# Patient Record
Sex: MALE | Race: WHITE | Employment: FULL TIME | URBAN - METROPOLITAN AREA
[De-identification: names, ages, dates, MRNs, and addresses within clinical notes are randomized per-mention and may not be internally consistent; named-entity substitution may affect disease eponyms.]

---

## 2017-09-14 ENCOUNTER — HOSPITAL ENCOUNTER (OUTPATIENT)
Dept: GENERAL RADIOLOGY | Age: 32
Discharge: HOME | End: 2017-09-14
Payer: COMMERCIAL

## 2017-09-14 ENCOUNTER — HOSPITAL ENCOUNTER (OUTPATIENT)
Age: 32
Discharge: HOME | End: 2017-09-14
Payer: COMMERCIAL

## 2017-09-14 ENCOUNTER — OFFICE VISIT (OUTPATIENT)
Dept: FAMILY MEDICINE CLINIC | Age: 32
End: 2017-09-14

## 2017-09-14 VITALS
OXYGEN SATURATION: 98 % | SYSTOLIC BLOOD PRESSURE: 98 MMHG | DIASTOLIC BLOOD PRESSURE: 58 MMHG | TEMPERATURE: 98.3 F | WEIGHT: 153.8 LBS | BODY MASS INDEX: 20.83 KG/M2 | HEART RATE: 66 BPM | HEIGHT: 72 IN

## 2017-09-14 DIAGNOSIS — R10.32 LEFT GROIN PAIN: Primary | ICD-10-CM

## 2017-09-14 DIAGNOSIS — R05.9 COUGH: ICD-10-CM

## 2017-09-14 PROCEDURE — 99203 OFFICE O/P NEW LOW 30 MIN: CPT | Performed by: FAMILY MEDICINE

## 2017-09-14 PROCEDURE — 71020 XR CHEST STANDARD TWO VW: CPT

## 2017-09-14 ASSESSMENT — ENCOUNTER SYMPTOMS
ABDOMINAL PAIN: 0
SHORTNESS OF BREATH: 0
DIARRHEA: 0
NAUSEA: 0
VOMITING: 0
COUGH: 1
CONSTIPATION: 0
SORE THROAT: 0

## 2017-09-14 ASSESSMENT — PATIENT HEALTH QUESTIONNAIRE - PHQ9
2. FEELING DOWN, DEPRESSED OR HOPELESS: 0
1. LITTLE INTEREST OR PLEASURE IN DOING THINGS: 0
SUM OF ALL RESPONSES TO PHQ QUESTIONS 1-9: 0
SUM OF ALL RESPONSES TO PHQ9 QUESTIONS 1 & 2: 0

## 2017-09-15 DIAGNOSIS — R10.32 LEFT GROIN PAIN: Primary | ICD-10-CM

## 2019-04-01 ENCOUNTER — HOSPITAL ENCOUNTER (EMERGENCY)
Age: 34
Discharge: HOME | End: 2019-04-01
Attending: EMERGENCY MEDICINE

## 2019-04-01 VITALS
SYSTOLIC BLOOD PRESSURE: 108 MMHG | HEIGHT: 72 IN | WEIGHT: 154.7 LBS | OXYGEN SATURATION: 99 % | HEART RATE: 71 BPM | TEMPERATURE: 97.9 F | DIASTOLIC BLOOD PRESSURE: 76 MMHG | RESPIRATION RATE: 16 BRPM | BODY MASS INDEX: 20.95 KG/M2

## 2019-04-01 DIAGNOSIS — K08.89 PAIN, DENTAL: Primary | ICD-10-CM

## 2019-04-01 PROCEDURE — 99282 EMERGENCY DEPT VISIT SF MDM: CPT

## 2019-04-01 RX ORDER — CLINDAMYCIN HYDROCHLORIDE 300 MG/1
300 CAPSULE ORAL 3 TIMES DAILY
Qty: 21 CAPSULE | Refills: 0 | Status: SHIPPED | OUTPATIENT
Start: 2019-04-01 | End: 2019-04-08

## 2019-04-01 RX ORDER — HYDROCODONE BITARTRATE AND ACETAMINOPHEN 5; 325 MG/1; MG/1
1 TABLET ORAL EVERY 8 HOURS PRN
Qty: 15 TABLET | Refills: 0 | Status: SHIPPED | OUTPATIENT
Start: 2019-04-01 | End: 2019-04-08

## 2019-04-01 ASSESSMENT — PAIN DESCRIPTION - DESCRIPTORS: DESCRIPTORS: CONSTANT;THROBBING;SHARP

## 2019-04-01 ASSESSMENT — PAIN DESCRIPTION - LOCATION: LOCATION: TEETH

## 2019-04-01 ASSESSMENT — PAIN SCALES - GENERAL: PAINLEVEL_OUTOF10: 7

## 2019-04-01 ASSESSMENT — PAIN DESCRIPTION - ORIENTATION: ORIENTATION: LOWER;UPPER

## 2019-04-01 NOTE — ED PROVIDER NOTES
4, 8 or more for level 5)     Vitals:    04/01/19 1807   BP: 108/76   Pulse: 71   Resp: 16   Temp: 97.9 °F (36.6 °C)   TempSrc: Oral   SpO2: 99%   Weight: 154 lb 11.2 oz (70.2 kg)   Height: 6' (1.829 m)         Physical Exam   Constitutional: He is oriented to person, place, and time. He appears well-developed and well-nourished. HENT:   Mouth/Throat: Uvula is midline, oropharynx is clear and moist and mucous membranes are normal. No oral lesions. No dental abscesses. Eyes: Conjunctivae are normal. Right eye exhibits no discharge. Left eye exhibits no discharge. Neck: Neck supple. Cardiovascular: Normal rate and regular rhythm. Pulmonary/Chest: Effort normal. No respiratory distress. Lymphadenopathy:     He has no cervical adenopathy. Neurological: He is alert and oriented to person, place, and time. Skin: Skin is warm and dry. No erythema. Psychiatric: He has a normal mood and affect. His behavior is normal.           DIAGNOSTIC RESULTS     EKG: All EKG's are interpreted by the Emergency Department Physician who either signs or Co-signs this chart in the absence of a cardiologist.    RADIOLOGY:   Non-plain film images such as CT, Ultrasound and MRI are read by the radiologist. Plain radiographic images are visualized and preliminarily interpreted by the emergency physician with the below findings:    Interpretation per the Radiologist below, if available at the time of this note:    No orders to display           LABS:  Labs Reviewed - No data to display    All other labs were within normal range or not returned as of this dictation. EMERGENCY DEPARTMENT COURSE and DIFFERENTIAL DIAGNOSIS/MDM:   Vitals:    Vitals:    04/01/19 1807   BP: 108/76   Pulse: 71   Resp: 16   Temp: 97.9 °F (36.6 °C)   TempSrc: Oral   SpO2: 99%   Weight: 154 lb 11.2 oz (70.2 kg)   Height: 6' (1.829 m)       Medical Decision Making: A 60-year-old male with dental pain. He is afebrile and does not appear ill.   2 broken teeth. No dental abscess. Emergent workup was not indicated. Allergic to penicillin and codeine. He will be discharged home with clindamycin and Norco.      FINAL IMPRESSION      1. Pain, dental          DISPOSITION/PLAN   DISPOSITION Decision To Discharge 04/01/2019 06:24:44 PM      PATIENT REFERRED TO:     Follow-up with a dentist even if your symptoms improve with these medications. Call in 1 day      Carlos Bower MD  [address]  [phone]      As needed      DISCHARGE MEDICATIONS:     New Prescriptions    CLINDAMYCIN (CLEOCIN) 300 MG CAPSULE    Take 1 capsule by mouth 3 times daily for 7 days    HYDROCODONE-ACETAMINOPHEN (NORCO) 5-325 MG PER TABLET    Take 1 tablet by mouth every 8 hours as needed for Pain for up to 7 days. (Please note that portions of this note were completed with a voice recognition program.  Efforts were made to edit the dictations but occasionally words are mis-transcribed. )    GRANT ESQUIVEL, HERSON - HERSON Dupree - STELLA  04/01/19 2584

## 2019-04-01 NOTE — ED PROVIDER NOTES
Attending Supervisory Note/Shared Visit   I have personally performed a face to face diagnostic evaluation on this patient. I have reviewed the mid-levels findings and agree.   Treatment and management      (Please note that portions of this note were completed with a voice recognition program.  Efforts were made to edit the dictations but occasionally words are mis-transcribed.)    Irwin Sanchez MD  Attending Emergency Physician       Irwin Sanchez MD  04/01/19 255 Yg Yen MD  04/01/19 5303

## 2019-06-28 ENCOUNTER — HOSPITAL ENCOUNTER (EMERGENCY)
Age: 34
Discharge: HOME | End: 2019-06-28
Attending: EMERGENCY MEDICINE

## 2019-06-28 VITALS
TEMPERATURE: 98.1 F | OXYGEN SATURATION: 100 % | BODY MASS INDEX: 22.75 KG/M2 | HEIGHT: 72 IN | WEIGHT: 168 LBS | RESPIRATION RATE: 18 BRPM | SYSTOLIC BLOOD PRESSURE: 140 MMHG | HEART RATE: 64 BPM | DIASTOLIC BLOOD PRESSURE: 83 MMHG

## 2019-06-28 DIAGNOSIS — K08.89 PAIN, DENTAL: Primary | ICD-10-CM

## 2019-06-28 PROCEDURE — 99282 EMERGENCY DEPT VISIT SF MDM: CPT

## 2019-06-28 RX ORDER — CLINDAMYCIN HYDROCHLORIDE 150 MG/1
300 CAPSULE ORAL 3 TIMES DAILY
Qty: 42 CAPSULE | Refills: 0 | Status: SHIPPED | OUTPATIENT
Start: 2019-06-28 | End: 2019-07-05

## 2019-06-28 RX ORDER — TRAMADOL HYDROCHLORIDE 50 MG/1
50 TABLET ORAL EVERY 6 HOURS PRN
Qty: 12 TABLET | Refills: 0 | Status: SHIPPED | OUTPATIENT
Start: 2019-06-28 | End: 2019-07-01

## 2019-06-28 ASSESSMENT — PAIN DESCRIPTION - LOCATION: LOCATION: TEETH

## 2019-06-28 ASSESSMENT — ENCOUNTER SYMPTOMS
RHINORRHEA: 0
SHORTNESS OF BREATH: 0
CONSTIPATION: 0
DIARRHEA: 0
NAUSEA: 0
ABDOMINAL PAIN: 0
WHEEZING: 0
SINUS PRESSURE: 0
COUGH: 0
VOMITING: 0
SORE THROAT: 0
COLOR CHANGE: 0

## 2019-06-28 ASSESSMENT — PAIN DESCRIPTION - ORIENTATION: ORIENTATION: LEFT

## 2019-06-28 ASSESSMENT — PAIN DESCRIPTION - FREQUENCY: FREQUENCY: CONTINUOUS

## 2019-06-28 ASSESSMENT — PAIN DESCRIPTION - DESCRIPTORS: DESCRIPTORS: CONSTANT;THROBBING;SHARP;SHOOTING

## 2019-06-28 ASSESSMENT — PAIN SCALES - GENERAL: PAINLEVEL_OUTOF10: 9

## 2019-06-28 NOTE — ED NOTES
Patient education flyer \"[facility] What [facility] to [facility]\" was provided and reviewed. Questions answered and understanding was verbalized by the patient and/or family.           Bradley Rivera RN  06/28/19 6673

## 2019-06-28 NOTE — ED PROVIDER NOTES
[facility] ED  eMERGENCY dEPARTMENT eNCOUnter      Pt Name: Robbi Ware  MRN: [id number]  [place] 1985  Date of evaluation: 6/28/2019  Provider: Demetrius Patel NP, HERSON - Seth [id number]       Chief Complaint   Patient presents with    Dental Pain     dental appt next tues         HISTORY OF PRESENT ILLNESS  (Location/Symptom, Timing/Onset, Context/Setting, Quality, Duration, Modifying Factors, Severity.)   Robbi Ware is a 29 y.o. male who presents to the emergency department by private vehicle for evaluation of left upper dental pain. Patient states that couple of days he has had pain to his left upper gumline. He states he has an appointment with his dentist on Tuesday. He denies any fevers or chills. He states the pain radiates into his ear. He rates the pain a 9. Nursing Notes were reviewed. ALLERGIES     Penicillins and Codeine    CURRENT MEDICATIONS       Discharge Medication List as of 6/28/2019  1:15 PM          PAST MEDICAL HISTORY   History reviewed. No pertinent past medical history. SURGICAL HISTORY           Procedure Laterality Date    WRIST SURGERY Right          FAMILY HISTORY           Problem Relation Age of Onset    Heart Disease Mother     Allergy (Severe) Father      Family Status   Relation Name Status    Mother  Alive    Father  Alive        SOCIAL HISTORY      reports that he has been smoking cigarettes. He has been smoking about 1.00 pack per day. He has never used smokeless tobacco. He reports that he drinks alcohol. He reports that he has current or past drug history. Drug: Marijuana. REVIEW OF SYSTEMS    (2-9 systems for level 4, 10 or more for level 5)     Review of Systems   Constitutional: Negative for chills, fever and unexpected weight change. HENT: Positive for dental problem. Negative for congestion, rhinorrhea, sinus pressure and sore throat. Respiratory: Negative for cough, shortness of breath and wheezing.     Cardiovascular: Negative for chest pain and palpitations. Gastrointestinal: Negative for abdominal pain, constipation, diarrhea, nausea and vomiting. Genitourinary: Negative for dysuria and hematuria. Musculoskeletal: Negative for arthralgias and myalgias. Skin: Negative for color change and rash. Neurological: Negative for dizziness, weakness and headaches. Hematological: Negative for adenopathy. Except as noted above the remainder of the review of systems was reviewed and negative. PHYSICAL EXAM    (up to 7 for level 4, 8 or more for level 5)     ED Triage Vitals [06/28/19 1257]   BP Temp Temp Source Pulse Resp SpO2 Height Weight   (!) 140/83 98.1 °F (36.7 °C) Oral 64 18 100 % 6' (1.829 m) 168 lb (76.2 kg)       Physical Exam   Constitutional: He is oriented to person, place, and time. He appears well-developed and well-nourished. HENT:   Head: Normocephalic and atraumatic. Mouth/Throat: Oropharynx is clear and moist.       Swelling to floor of the mouth   Eyes: Pupils are equal, round, and reactive to light. Conjunctivae are normal.   Neck: Normal range of motion. Neck supple. Cardiovascular: Normal rate and regular rhythm. Pulmonary/Chest: Effort normal and breath sounds normal. No stridor. No respiratory distress. Abdominal: Soft. Bowel sounds are normal.   Musculoskeletal: Normal range of motion. Lymphadenopathy:     He has no cervical adenopathy. Neurological: He is alert and oriented to person, place, and time. Skin: Skin is warm and dry. No rash noted. Psychiatric: He has a normal mood and affect. Vitals reviewed. LABS:  Labs Reviewed - No data to display    All other labs were within normal range or not returned as of this dictation.     EMERGENCY DEPARTMENT COURSE and DIFFERENTIAL DIAGNOSIS/MDM:   Vitals:    Vitals:    06/28/19 1257   BP: (!) 140/83   Pulse: 64   Resp: 18   Temp: 98.1 °F (36.7 °C)   TempSrc: Oral   SpO2: 100%   Weight: 168 lb (76.2 kg)   Height: 6' (1.829 m)       Medical Decision Making: Will be placed on clindamycin and tramadol. Follow-up with his dentist as scheduled on Tuesday  FINAL IMPRESSION      1. Pain, dental          DISPOSITION/PLAN   DISPOSITION Decision To Discharge 06/28/2019 01:06:51 PM      PATIENT REFERRED TO:   [facility] ED  [address]  [phone]        same day PCP  [phone]          DISCHARGE MEDICATIONS:     Discharge Medication List as of 6/28/2019  1:15 PM      START taking these medications    Details   clindamycin (CLEOCIN) 150 MG capsule Take 2 capsules by mouth 3 times daily for 7 days, Disp-42 capsule, R-0Print      traMADol (ULTRAM) 50 MG tablet Take 1 tablet by mouth every 6 hours as needed for Pain for up to 3 days. Intended supply: 3 days.  Take lowest dose possible to manage pain, Disp-12 tablet, R-0Print                 (Please note that portions of this note were completed with a voice recognition program.  Efforts were made to edit the dictations but occasionally words are mis-transcribed.)    Nathanael Martin NP, APRN - CNP  Certified Nurse Practitioner          HERSON Olivares CNP  06/28/19 1095

## 2019-09-02 ENCOUNTER — HOSPITAL ENCOUNTER (EMERGENCY)
Age: 34
Discharge: ANOTHER HOSPITAL | End: 2019-09-02
Attending: EMERGENCY MEDICINE

## 2019-09-02 ENCOUNTER — APPOINTMENT (OUTPATIENT)
Dept: GENERAL RADIOLOGY | Age: 34
End: 2019-09-02

## 2019-09-02 ENCOUNTER — HOSPITAL ENCOUNTER (EMERGENCY)
Age: 34
Discharge: HOME | End: 2019-09-02
Attending: EMERGENCY MEDICINE

## 2019-09-02 VITALS
TEMPERATURE: 98.5 F | SYSTOLIC BLOOD PRESSURE: 125 MMHG | HEART RATE: 61 BPM | BODY MASS INDEX: 21.13 KG/M2 | HEIGHT: 72 IN | RESPIRATION RATE: 28 BRPM | DIASTOLIC BLOOD PRESSURE: 92 MMHG | WEIGHT: 156 LBS | OXYGEN SATURATION: 100 %

## 2019-09-02 DIAGNOSIS — T30.0 BURN: Primary | ICD-10-CM

## 2019-09-02 LAB
ABO/RH: NORMAL
ABSOLUTE EOS #: 0.1 K/UL (ref 0–0.4)
ABSOLUTE IMMATURE GRANULOCYTE: ABNORMAL K/UL (ref 0–0.3)
ABSOLUTE LYMPH #: 2.3 K/UL (ref 1–4.8)
ABSOLUTE MONO #: 1.1 K/UL (ref 0.1–1.3)
ALBUMIN SERPL-MCNC: 5.1 G/DL (ref 3.5–5.2)
ALBUMIN/GLOBULIN RATIO: NORMAL (ref 1–2.5)
ALLEN TEST: ABNORMAL
ALP BLD-CCNC: 74 U/L (ref 40–129)
ALT SERPL-CCNC: 12 U/L (ref 5–41)
AMPHETAMINE SCREEN URINE: NEGATIVE
ANION GAP SERPL CALCULATED.3IONS-SCNC: 12 MMOL/L (ref 9–17)
ANION GAP SERPL CALCULATED.3IONS-SCNC: 15 MMOL/L (ref 9–17)
ANTIBODY SCREEN: NEGATIVE
ARM BAND NUMBER: NORMAL
AST SERPL-CCNC: 27 U/L
BARBITURATE SCREEN URINE: NEGATIVE
BASOPHILS # BLD: 1 % (ref 0–2)
BASOPHILS ABSOLUTE: 0.1 K/UL (ref 0–0.2)
BENZODIAZEPINE SCREEN, URINE: NEGATIVE
BILIRUB SERPL-MCNC: 0.58 MG/DL (ref 0.3–1.2)
BILIRUBIN URINE: NEGATIVE
BLOOD BANK SPECIMEN: ABNORMAL
BUN BLDV-MCNC: 12 MG/DL (ref 6–20)
BUN BLDV-MCNC: 13 MG/DL (ref 6–20)
BUN/CREAT BLD: NORMAL (ref 9–20)
BUPRENORPHINE URINE: ABNORMAL
CALCIUM SERPL-MCNC: 10 MG/DL (ref 8.6–10.4)
CANNABINOID SCREEN URINE: POSITIVE
CARBOXYHEMOGLOBIN: 4.4 % (ref 0–5)
CHLORIDE BLD-SCNC: 102 MMOL/L (ref 98–107)
CHLORIDE BLD-SCNC: 102 MMOL/L (ref 98–107)
CO2: 24 MMOL/L (ref 20–31)
CO2: 24 MMOL/L (ref 20–31)
COCAINE METABOLITE, URINE: NEGATIVE
COLOR: YELLOW
COMMENT UA: NORMAL
CREAT SERPL-MCNC: 0.75 MG/DL (ref 0.7–1.2)
CREAT SERPL-MCNC: 0.87 MG/DL (ref 0.7–1.2)
DIFFERENTIAL TYPE: ABNORMAL
EOSINOPHILS RELATIVE PERCENT: 1 % (ref 0–4)
ETHANOL PERCENT: <0.01 %
ETHANOL PERCENT: <0.01 %
ETHANOL: <10 MG/DL
ETHANOL: <10 MG/DL
EXPIRATION DATE: NORMAL
FIO2: ABNORMAL
GFR AFRICAN AMERICAN: >60 ML/MIN
GFR AFRICAN AMERICAN: >60 ML/MIN
GFR NON-AFRICAN AMERICAN: >60 ML/MIN
GFR NON-AFRICAN AMERICAN: >60 ML/MIN
GFR SERPL CREATININE-BSD FRML MDRD: ABNORMAL ML/MIN/{1.73_M2}
GFR SERPL CREATININE-BSD FRML MDRD: ABNORMAL ML/MIN/{1.73_M2}
GFR SERPL CREATININE-BSD FRML MDRD: NORMAL ML/MIN/{1.73_M2}
GFR SERPL CREATININE-BSD FRML MDRD: NORMAL ML/MIN/{1.73_M2}
GLUCOSE BLD-MCNC: 88 MG/DL (ref 70–99)
GLUCOSE BLD-MCNC: 98 MG/DL (ref 70–99)
GLUCOSE URINE: NEGATIVE
HCG QUALITATIVE: ABNORMAL
HCO3 VENOUS: 25.3 MMOL/L (ref 24–30)
HCT VFR BLD CALC: 47.4 % (ref 40.7–50.3)
HCT VFR BLD CALC: 51.3 % (ref 41–53)
HEMOGLOBIN: 15.3 G/DL (ref 13–17)
HEMOGLOBIN: 17 G/DL (ref 13.5–17.5)
IMMATURE GRANULOCYTES: ABNORMAL %
INR BLD: 0.9
INR BLD: 1
KETONES, URINE: NEGATIVE
LEUKOCYTE ESTERASE, URINE: NEGATIVE
LYMPHOCYTES # BLD: 22 % (ref 24–44)
MCH RBC QN AUTO: 31.3 PG (ref 26–34)
MCH RBC QN AUTO: 31.4 PG (ref 25.2–33.5)
MCHC RBC AUTO-ENTMCNC: 32.3 G/DL (ref 28.4–34.8)
MCHC RBC AUTO-ENTMCNC: 33 G/DL (ref 31–37)
MCV RBC AUTO: 94.7 FL (ref 80–100)
MCV RBC AUTO: 97.3 FL (ref 82.6–102.9)
MDMA URINE: ABNORMAL
METHADONE SCREEN, URINE: NEGATIVE
METHAMPHETAMINE, URINE: ABNORMAL
METHEMOGLOBIN: ABNORMAL % (ref 0–1.5)
MODE: ABNORMAL
MONOCYTES # BLD: 11 % (ref 1–7)
NEGATIVE BASE EXCESS, VEN: 0.9 MMOL/L (ref 0–2)
NITRITE, URINE: NEGATIVE
NOTIFICATION TIME: ABNORMAL
NOTIFICATION: ABNORMAL
NRBC AUTOMATED: 0 PER 100 WBC
NRBC AUTOMATED: ABNORMAL PER 100 WBC
O2 DEVICE/FLOW/%: ABNORMAL
O2 SAT, VEN: 40.7 % (ref 60–85)
OPIATES, URINE: NEGATIVE
OXYCODONE SCREEN URINE: NEGATIVE
OXYHEMOGLOBIN: ABNORMAL % (ref 95–98)
PARTIAL THROMBOPLASTIN TIME: 27.7 SEC (ref 20.5–30.5)
PARTIAL THROMBOPLASTIN TIME: 31.8 SEC (ref 24–36)
PATIENT TEMP: 37
PCO2, VEN, TEMP ADJ: ABNORMAL MMHG (ref 39–55)
PCO2, VEN: 49.9 (ref 39–55)
PDW BLD-RTO: 13.6 % (ref 11.8–14.4)
PDW BLD-RTO: 14.6 % (ref 11.5–14.9)
PEEP/CPAP: ABNORMAL
PH UA: 5.5 (ref 5–8)
PH VENOUS: 7.33 (ref 7.32–7.42)
PH, VEN, TEMP ADJ: ABNORMAL (ref 7.32–7.42)
PHENCYCLIDINE, URINE: NEGATIVE
PLATELET # BLD: 174 K/UL (ref 138–453)
PLATELET # BLD: 196 K/UL (ref 150–450)
PLATELET ESTIMATE: ABNORMAL
PMV BLD AUTO: 8 FL (ref 6–12)
PMV BLD AUTO: 9.7 FL (ref 8.1–13.5)
PO2, VEN, TEMP ADJ: ABNORMAL MMHG (ref 30–50)
PO2, VEN: 24.4 (ref 30–50)
POSITIVE BASE EXCESS, VEN: ABNORMAL MMOL/L (ref 0–2)
POTASSIUM SERPL-SCNC: 3.6 MMOL/L (ref 3.7–5.3)
POTASSIUM SERPL-SCNC: 4 MMOL/L (ref 3.7–5.3)
PROPOXYPHENE, URINE: ABNORMAL
PROTEIN UA: NEGATIVE
PROTHROMBIN TIME: 10.8 SEC (ref 9–12)
PROTHROMBIN TIME: 12.6 SEC (ref 11.8–14.6)
PSV: ABNORMAL
PT. POSITION: ABNORMAL
RBC # BLD: 4.87 M/UL (ref 4.21–5.77)
RBC # BLD: 5.42 M/UL (ref 4.5–5.9)
RBC # BLD: ABNORMAL 10*6/UL
RESPIRATORY RATE: ABNORMAL
SAMPLE SITE: ABNORMAL
SEG NEUTROPHILS: 65 % (ref 36–66)
SEGMENTED NEUTROPHILS ABSOLUTE COUNT: 6.9 K/UL (ref 1.3–9.1)
SET RATE: ABNORMAL
SODIUM BLD-SCNC: 138 MMOL/L (ref 135–144)
SODIUM BLD-SCNC: 141 MMOL/L (ref 135–144)
SPECIFIC GRAVITY UA: 1.01 (ref 1–1.03)
TEST INFORMATION: ABNORMAL
TEXT FOR RESPIRATORY: ABNORMAL
TOTAL HB: ABNORMAL G/DL (ref 12–16)
TOTAL PROTEIN: 8.1 G/DL (ref 6.4–8.3)
TOTAL RATE: ABNORMAL
TRICYCLIC ANTIDEPRESSANTS, UR: ABNORMAL
TURBIDITY: CLEAR
URINE HGB: NEGATIVE
UROBILINOGEN, URINE: NORMAL
VT: ABNORMAL
WBC # BLD: 10.5 K/UL (ref 3.5–11)
WBC # BLD: 14.4 K/UL (ref 3.5–11.3)
WBC # BLD: ABNORMAL 10*3/UL

## 2019-09-02 PROCEDURE — 86901 BLOOD TYPING SEROLOGIC RH(D): CPT

## 2019-09-02 PROCEDURE — 90471 IMMUNIZATION ADMIN: CPT | Performed by: EMERGENCY MEDICINE

## 2019-09-02 PROCEDURE — 86900 BLOOD TYPING SEROLOGIC ABO: CPT

## 2019-09-02 PROCEDURE — 80053 COMPREHEN METABOLIC PANEL: CPT

## 2019-09-02 PROCEDURE — 85610 PROTHROMBIN TIME: CPT

## 2019-09-02 PROCEDURE — 85025 COMPLETE CBC W/AUTO DIFF WBC: CPT

## 2019-09-02 PROCEDURE — 85730 THROMBOPLASTIN TIME PARTIAL: CPT

## 2019-09-02 PROCEDURE — 96374 THER/PROPH/DIAG INJ IV PUSH: CPT

## 2019-09-02 PROCEDURE — 85027 COMPLETE CBC AUTOMATED: CPT

## 2019-09-02 PROCEDURE — G0480 DRUG TEST DEF 1-7 CLASSES: HCPCS

## 2019-09-02 PROCEDURE — 82947 ASSAY GLUCOSE BLOOD QUANT: CPT

## 2019-09-02 PROCEDURE — 80307 DRUG TEST PRSMV CHEM ANLYZR: CPT

## 2019-09-02 PROCEDURE — 86850 RBC ANTIBODY SCREEN: CPT

## 2019-09-02 PROCEDURE — 84703 CHORIONIC GONADOTROPIN ASSAY: CPT

## 2019-09-02 PROCEDURE — 99284 EMERGENCY DEPT VISIT MOD MDM: CPT

## 2019-09-02 PROCEDURE — 84520 ASSAY OF UREA NITROGEN: CPT

## 2019-09-02 PROCEDURE — 82805 BLOOD GASES W/O2 SATURATION: CPT

## 2019-09-02 PROCEDURE — 2580000003 HC RX 258: Performed by: EMERGENCY MEDICINE

## 2019-09-02 PROCEDURE — 6360000002 HC RX W HCPCS: Performed by: EMERGENCY MEDICINE

## 2019-09-02 PROCEDURE — 82565 ASSAY OF CREATININE: CPT

## 2019-09-02 PROCEDURE — 80051 ELECTROLYTE PANEL: CPT

## 2019-09-02 PROCEDURE — 81003 URINALYSIS AUTO W/O SCOPE: CPT

## 2019-09-02 PROCEDURE — 99282 EMERGENCY DEPT VISIT SF MDM: CPT

## 2019-09-02 PROCEDURE — 36415 COLL VENOUS BLD VENIPUNCTURE: CPT

## 2019-09-02 PROCEDURE — 90715 TDAP VACCINE 7 YRS/> IM: CPT | Performed by: EMERGENCY MEDICINE

## 2019-09-02 RX ORDER — OXYCODONE HYDROCHLORIDE 5 MG/1
5 TABLET ORAL EVERY 8 HOURS PRN
Qty: 10 TABLET | Refills: 0 | Status: SHIPPED | OUTPATIENT
Start: 2019-09-02 | End: 2019-09-06

## 2019-09-02 RX ORDER — FENTANYL CITRATE 50 UG/ML
25 INJECTION, SOLUTION INTRAMUSCULAR; INTRAVENOUS
Status: DISCONTINUED | OUTPATIENT
Start: 2019-09-02 | End: 2019-09-02 | Stop reason: HOSPADM

## 2019-09-02 RX ORDER — OXYCODONE HYDROCHLORIDE 5 MG/1
5 TABLET ORAL EVERY 4 HOURS PRN
Status: DISCONTINUED | OUTPATIENT
Start: 2019-09-02 | End: 2019-09-02 | Stop reason: HOSPADM

## 2019-09-02 RX ORDER — GINSENG 100 MG
CAPSULE ORAL 3 TIMES DAILY
Status: DISCONTINUED | OUTPATIENT
Start: 2019-09-02 | End: 2019-09-02 | Stop reason: HOSPADM

## 2019-09-02 RX ORDER — IBUPROFEN 800 MG/1
800 TABLET ORAL EVERY 8 HOURS PRN
Qty: 30 TABLET | Refills: 0 | Status: SHIPPED | OUTPATIENT
Start: 2019-09-02

## 2019-09-02 RX ORDER — IBUPROFEN 400 MG/1
400 TABLET ORAL EVERY 6 HOURS
Status: DISCONTINUED | OUTPATIENT
Start: 2019-09-02 | End: 2019-09-02 | Stop reason: HOSPADM

## 2019-09-02 RX ORDER — ACETAMINOPHEN 500 MG
1000 TABLET ORAL EVERY 8 HOURS
Status: DISCONTINUED | OUTPATIENT
Start: 2019-09-02 | End: 2019-09-02 | Stop reason: HOSPADM

## 2019-09-02 RX ORDER — FENTANYL CITRATE 50 UG/ML
INJECTION, SOLUTION INTRAMUSCULAR; INTRAVENOUS
Status: DISCONTINUED
Start: 2019-09-02 | End: 2019-09-02 | Stop reason: HOSPADM

## 2019-09-02 RX ORDER — 0.9 % SODIUM CHLORIDE 0.9 %
1000 INTRAVENOUS SOLUTION INTRAVENOUS ONCE
Status: COMPLETED | OUTPATIENT
Start: 2019-09-02 | End: 2019-09-02

## 2019-09-02 RX ORDER — ACETAMINOPHEN 325 MG/1
650 TABLET ORAL EVERY 6 HOURS PRN
Qty: 120 TABLET | Refills: 0 | Status: SHIPPED | OUTPATIENT
Start: 2019-09-02

## 2019-09-02 RX ORDER — GAUZE BANDAGE 4" X 4"
1 SPONGE TOPICAL 2 TIMES DAILY
Qty: 1 EACH | Refills: 1 | Status: SHIPPED | OUTPATIENT
Start: 2019-09-02

## 2019-09-02 RX ORDER — GABAPENTIN 300 MG/1
300 CAPSULE ORAL EVERY 8 HOURS
Status: DISCONTINUED | OUTPATIENT
Start: 2019-09-02 | End: 2019-09-02 | Stop reason: HOSPADM

## 2019-09-02 RX ORDER — FENTANYL CITRATE 50 UG/ML
100 INJECTION, SOLUTION INTRAMUSCULAR; INTRAVENOUS ONCE
Status: COMPLETED | OUTPATIENT
Start: 2019-09-02 | End: 2019-09-02

## 2019-09-02 RX ADMIN — SODIUM CHLORIDE 1000 ML: 9 INJECTION, SOLUTION INTRAVENOUS at 16:23

## 2019-09-02 RX ADMIN — TETANUS TOXOID, REDUCED DIPHTHERIA TOXOID AND ACELLULAR PERTUSSIS VACCINE, ADSORBED 0.5 ML: 5; 2.5; 8; 8; 2.5 SUSPENSION INTRAMUSCULAR at 16:25

## 2019-09-02 RX ADMIN — FENTANYL CITRATE 100 MCG: 50 INJECTION INTRAMUSCULAR; INTRAVENOUS at 16:21

## 2019-09-02 ASSESSMENT — ENCOUNTER SYMPTOMS
SHORTNESS OF BREATH: 0
CONSTIPATION: 0
RESPIRATORY NEGATIVE: 1
ABDOMINAL PAIN: 0
EYES NEGATIVE: 1
EYE ITCHING: 0
COUGH: 0
SHORTNESS OF BREATH: 0
BACK PAIN: 0
NAUSEA: 0
GASTROINTESTINAL NEGATIVE: 1
COUGH: 0
RHINORRHEA: 0
ABDOMINAL PAIN: 0
DIARRHEA: 0
VOMITING: 0
BACK PAIN: 0
EYE REDNESS: 0
BURN: 1

## 2019-09-02 ASSESSMENT — PAIN SCALES - GENERAL
PAINLEVEL_OUTOF10: 7
PAINLEVEL_OUTOF10: 10
PAINLEVEL_OUTOF10: 10
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
## 2019-09-03 NOTE — PROGRESS NOTES
Date Procedure started:  9/2/2019     Time Procedure started:  0800     Location Completed:    x   Bedside     Tubbing Room  Medication Given:  no          Photos Taken       yes                                                       Please gracy dressing applied to OR debrided injuries/ skin to all areas that apply below:     S=Silvadene    B=Bacitracin    M= Mepilex    F= Furacin        STORM=Santyl                             SUL=Sulfamylon     D=Donor site/xeroform    E=Eucerin    O=Other (specify below)  DRESSING APPLICATION/ CHANGE DEBRIDEMENT      (Y/N) BODY LOCATION   HEAD, FACE AND NECK         SCALP      RT EAR     LT EAR   S  NECK     FACE        CHEST   B  ABDOMEN     BACK     BUTTOCK     GENITALIA     PERINEUM      S  RT UPPER ARM (Includes Shoulder)     LT UPPER ARM (Includes Shoulder)     RT LOWER ARM (Includes Elbow or Wrist)     LT LOWER ARM (Includes Elbow or Wrist)     RT HAND (Includes Fingers)     LT HAND (Includes Fingers)        RT UPPER LEG (Includes Hip)     LT UPPER LEG (Includes Hip)     RT LOWER LEG (Includes Knee)     LT LOWER LEG (Includes Knee)     RT FOOT (Includes Ankles or Toes)     LT FOOT (Includes Ankles or Toes)     ADDITIONAL NOTES:
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
